# Patient Record
Sex: MALE | Race: WHITE | HISPANIC OR LATINO | Employment: UNEMPLOYED | URBAN - METROPOLITAN AREA
[De-identification: names, ages, dates, MRNs, and addresses within clinical notes are randomized per-mention and may not be internally consistent; named-entity substitution may affect disease eponyms.]

---

## 2022-09-11 ENCOUNTER — HOSPITAL ENCOUNTER (EMERGENCY)
Facility: HOSPITAL | Age: 22
Discharge: HOME | End: 2022-09-11
Attending: EMERGENCY MEDICINE

## 2022-09-11 VITALS
OXYGEN SATURATION: 100 % | SYSTOLIC BLOOD PRESSURE: 146 MMHG | RESPIRATION RATE: 18 BRPM | TEMPERATURE: 98 F | DIASTOLIC BLOOD PRESSURE: 86 MMHG | HEART RATE: 78 BPM

## 2022-09-11 DIAGNOSIS — T43.615A CAFFEINE ADVERSE REACTION, INITIAL ENCOUNTER: Primary | ICD-10-CM

## 2022-09-11 LAB
BUN SERPL-MCNC: 12 MG/DL (ref 6–30)
CHLORIDE SERPL-SCNC: 100 MMOL/L (ref 95–110)
CREAT SERPL-MCNC: 0.9 MG/DL (ref 0.5–1.4)
GLUCOSE SERPL-MCNC: 115 MG/DL (ref 70–110)
HCT VFR BLD CALC: 44 %PCV (ref 36–54)
POC IONIZED CALCIUM: 1.11 MMOL/L (ref 1.06–1.42)
POC TCO2 (MEASURED): 24 MMOL/L (ref 23–29)
POTASSIUM BLD-SCNC: 4 MMOL/L (ref 3.5–5.1)
SAMPLE: ABNORMAL
SODIUM BLD-SCNC: 137 MMOL/L (ref 136–145)

## 2022-09-11 PROCEDURE — 93010 ELECTROCARDIOGRAM REPORT: CPT | Mod: ,,, | Performed by: INTERNAL MEDICINE

## 2022-09-11 PROCEDURE — 80047 BASIC METABLC PNL IONIZED CA: CPT

## 2022-09-11 PROCEDURE — 93010 EKG 12-LEAD: ICD-10-PCS | Mod: ,,, | Performed by: INTERNAL MEDICINE

## 2022-09-11 PROCEDURE — 99284 PR EMERGENCY DEPT VISIT,LEVEL IV: ICD-10-PCS | Mod: ,,, | Performed by: EMERGENCY MEDICINE

## 2022-09-11 PROCEDURE — 99284 EMERGENCY DEPT VISIT MOD MDM: CPT | Mod: ,,, | Performed by: EMERGENCY MEDICINE

## 2022-09-11 PROCEDURE — 99284 EMERGENCY DEPT VISIT MOD MDM: CPT | Mod: 25

## 2022-09-11 PROCEDURE — 93005 ELECTROCARDIOGRAM TRACING: CPT

## 2022-09-11 NOTE — ED PROVIDER NOTES
"Encounter Date: 9/11/2022       History     Chief Complaint   Patient presents with    Fatigue     Pt reports he drank a total of 4 Redbulls - one at 12pm, one at 3pm, one at 8pm, one at 1 am. Pt reports he feels like "all of my energy has left me." Pt reports he feels more stable now because he has been drinking a lot of water. Denies chest pain/SOB. Pt reports anxiety and feeling very jittery.      20 y/o M with no pertinent past medical history presents to the ED complaining of a 1 hour history of "feeling sick".  Patient states that he has had 4 red bulls in the last 24 hours without any sleep.   Patient is from [place] which is why states he has not slept recently.  Around 4:00 a.m. patient started to feel as if his heart was agitated and felt as though he wanted to pass out.  When further questioned he states that the feeling is just because he really wants to sleep.  The reason he presents the ED at this time is  week as he is afraid having a heart attack while he sleeps.    Patient does report that he has an increased frequency urination and some chills.  Patient states that he has been drinking more water in the last hour and that it is helped him feel better. Patient denies chest pain, shortness of breath, abdominal pain, headache, fever, nausea, vomiting, diarrhea, dysuria and hematuria.  No other complaints at this time.    The history is provided by the patient.   Review of patient's allergies indicates:  No Known Allergies  No past medical history on file.  No past surgical history on file.  No family history on file.     Review of Systems   Constitutional:  Positive for chills. Negative for activity change and fever.   HENT:  Negative for congestion, ear pain and sore throat.    Respiratory:  Negative for shortness of breath and stridor.    Cardiovascular:  Negative for chest pain and palpitations.   Gastrointestinal:  Negative for abdominal pain, nausea and vomiting.   Genitourinary:  Positive for " frequency (Increased). Negative for dysuria and urgency.   Musculoskeletal:  Negative for back pain.   Skin:  Negative for rash.   Allergic/Immunologic: Negative for environmental allergies, food allergies and immunocompromised state.   Neurological:  Negative for dizziness, syncope, weakness and headaches.   Hematological:  Does not bruise/bleed easily.     Physical Exam     Initial Vitals   BP Pulse Resp Temp SpO2   09/11/22 0526 09/11/22 0526 09/11/22 0526 09/11/22 0529 09/11/22 0526   (!) 146/86 78 18 97.6 °F (36.4 °C) 100 %      MAP       --                Physical Exam    Nursing note and vitals reviewed.  Constitutional: Vital signs are normal. He appears well-developed and well-nourished. He is not diaphoretic. No distress.   HENT:   Head: Normocephalic and atraumatic.   Right Ear: External ear normal.   Left Ear: External ear normal.   Eyes: EOM are normal. Right eye exhibits no discharge. Left eye exhibits no discharge.   Neck: Trachea normal. Neck supple. No thyroid mass present.   Cardiovascular:  Normal rate, regular rhythm, normal heart sounds and intact distal pulses.     Exam reveals no gallop and no friction rub.       No murmur heard.  Pulmonary/Chest: Breath sounds normal. No respiratory distress. He has no wheezes. He has no rhonchi. He has no rales.   Abdominal: Abdomen is soft. Bowel sounds are normal. He exhibits no distension. There is no abdominal tenderness. There is no rebound and no guarding.   Musculoskeletal:      Cervical back: Neck supple.     Neurological: He is alert and oriented to person, place, and time. He has normal strength. No cranial nerve deficit or sensory deficit. GCS score is 15. GCS eye subscore is 4. GCS verbal subscore is 5. GCS motor subscore is 6.   Skin: Skin is warm and dry. Capillary refill takes less than 2 seconds. No rash noted.   Psychiatric: He has a normal mood and affect.       ED Course   Procedures  Labs Reviewed   ISTAT PROCEDURE - Abnormal; Notable for  the following components:       Result Value    POC Glucose 115 (*)     All other components within normal limits   ISTAT CHEM8     EKG Readings: (Independently Interpreted)   Initial Reading: No STEMI. Rhythm: Sinus Bradycardia. Heart Rate: 57. Ectopy: No Ectopy. Conduction: Normal. ST Segments: Normal ST Segments. T Waves: Normal. Axis: Normal.     Imaging Results    None          Medications - No data to display  Medical Decision Making:   Initial Assessment:    21-year-old male who appears to be in no acute distress presents to the ED complaining of feeling sick after multiple red bowl intake.  ABC's intact.  Physical exam is grossly unremarkable.  Differential Diagnosis:    Drug abuse   Electrolyte abnormality   Anemia   Cardiac arrhythmia      ED Management:  EKG and I-STAT Chem 8 are unremarkable.  Return precautions will be provided.  I will discharge the patient.                    Clinical Impression:   Final diagnoses:  [T43.615A] Caffeine adverse reaction, initial encounter (Primary)        ED Disposition Condition    Discharge Stable          ED Prescriptions    None       Follow-up Information       Follow up With Specialties Details Why Contact Info Additional Information    [facility] Emergency Medicine Go to  If symptoms worsen [id number]  [facility] [id number] [phone]     [facility] Internal Medicine Schedule an appointment as soon as possible for a visit in 1 week FOR PCP APPOINTMENT IF NEEDED [address]  [facility] [phone] [facility] Please park in surface lot and check in at central registration desk             Carlton Morris MD  Resident  09/11/22 0762

## 2022-09-11 NOTE — PROVIDER PROGRESS NOTES - EMERGENCY DEPT.
Encounter Date: 9/11/2022    ED Physician Progress Notes          Physician Attestation Statement for Resident:  As the supervising MD   Physician Attestation Statement: I have personally seen and examined this patient.       I agree with the history unless otherwise noted.   Patient is a 21-year-old male, previously healthy who presents emergency department with chest pain, anxiety after drinking caffeine multiple times overnight.    As the supervising MD I agree with the PE unless otherwise noted.      Vital signs and nursing notes reviewed in Epic     VS WNL  HEENT: NC/AT   Neck: FROM/supple  CV: RRR, no m/g/r  RESP: Lungs CTAB  ABD: Soft, NT/ND, no CVA TTP   MSK: No LE edema   Neuro: Awake, alert, SCHILLING without difficulty      I have reviewed and agree with the residents interpretation of the following unless otherwise noted:   lab data  imaging studies    EKG and rhythm strips.    I have also reviewed the following: no old records for review    As the supervising MD I agree with the treatment, course, plan, and disposition unless otherwise noted.    Patient's symptoms are most consistent with caffeine use    Symptoms improved in the emergency department    Patient was counseled on appropriate caffeine use in the future.

## 2022-09-11 NOTE — ED NOTES
I-STAT Chem-8+ Results:   Value Reference Range   Sodium 137 136-145 mmol/L   Potassium  4 3.5-5.1 mmol/L   Chloride 100  mmol/L   Ionized Calcium 1.11 1.06-1.42 mmol/L   CO2 (measured) 24 23-29 mmol/L   Glucose 115  mg/dL   BUN 12 6-30 mg/dL   Creatinine 0.9 0.5-1.4 mg/dL   Hematocrit 44 36-54%